# Patient Record
Sex: FEMALE | Race: WHITE | Employment: FULL TIME | URBAN - METROPOLITAN AREA
[De-identification: names, ages, dates, MRNs, and addresses within clinical notes are randomized per-mention and may not be internally consistent; named-entity substitution may affect disease eponyms.]

---

## 2022-03-19 PROBLEM — Z30.430 ENCOUNTER FOR IUD INSERTION: Status: ACTIVE | Noted: 2019-07-11

## 2022-03-19 PROBLEM — F33.41 RECURRENT MAJOR DEPRESSIVE DISORDER, IN PARTIAL REMISSION (HCC): Status: ACTIVE | Noted: 2017-10-13

## 2022-03-20 PROBLEM — N64.4 NIPPLE PAIN: Status: ACTIVE | Noted: 2019-07-11

## 2022-06-01 ENCOUNTER — OFFICE VISIT (OUTPATIENT)
Dept: OBGYN CLINIC | Age: 37
End: 2022-06-01
Payer: COMMERCIAL

## 2022-06-01 VITALS
BODY MASS INDEX: 23.14 KG/M2 | HEIGHT: 63 IN | DIASTOLIC BLOOD PRESSURE: 60 MMHG | WEIGHT: 130.6 LBS | SYSTOLIC BLOOD PRESSURE: 100 MMHG

## 2022-06-01 DIAGNOSIS — Z11.3 SCREENING EXAMINATION FOR STD (SEXUALLY TRANSMITTED DISEASE): Primary | ICD-10-CM

## 2022-06-01 PROCEDURE — 99213 OFFICE O/P EST LOW 20 MIN: CPT | Performed by: NURSE PRACTITIONER

## 2022-06-01 NOTE — PROGRESS NOTES
The patient is a 39 y.o. No obstetric history on file. who is seen for STD testing. Patient states she has a new partner. Denies symptoms. Partner recently test and normal .    Paragrard IUD with Dr. Mariam Flanagan on 7/11/2019  Last pap 11/14/2019 Negative, STD negative       HISTORY:    No obstetric history on file. Patient's last menstrual period was 05/07/2022. Sexual History:  has sex with males  Contraception:  IUD  Current Outpatient Medications on File Prior to Visit   Medication Sig Dispense Refill    GARLIC PO Take by mouth      ARIPiprazole (ABILIFY) 2 MG tablet Take 2 mg by mouth      buPROPion (WELLBUTRIN SR) 100 MG extended release tablet TK 1 T PO QAM FOR DEPRESSION/ ADD      Paragard Intrauterine Copper IUD by IntraUTERine route      LORazepam (ATIVAN) 0.5 MG tablet TK 1 TO 2 TS PO QHS PRF INSOMNIA       No current facility-administered medications on file prior to visit. ROS:  Feeling well. No dyspnea or chest pain on exertion. No abdominal pain, change in bowel habits, black or bloody stools. No urinary tract symptoms. GYN ROS: normal menses, no abnormal bleeding, pelvic pain or discharge, no breast pain or new or enlarging lumps on self exam.    PHYSICAL EXAM:  Blood pressure 100/60, height 5' 3\" (1.6 m), weight 130 lb 9.6 oz (59.2 kg), last menstrual period 05/07/2022. The patient appears well, alert, oriented x 3, in no distress. .  Pelvic: normal external genitalia, vulva, vagina, cervix, uterus and adnexa, VULVA: normal appearing vulva with no masses, tenderness or lesions, VAGINA: normal appearing vagina with normal color and discharge, no lesions. ASSESSMENT:  Encounter Diagnosis   Name Primary?     Screening examination for STD (sexually transmitted disease) Yes       PLAN:  All questions answered  Check gc/chl/trich  Check blood screening    Orders Placed This Encounter   Procedures    Chlamydia, Gonorrhea, Trichomoniasis Swab     Standing Status:   Future     Number of Occurrences:   1     Standing Expiration Date:   6/1/2023    RPR Reflex to Titer and TPPA     Standing Status:   Future     Number of Occurrences:   1     Standing Expiration Date:   6/1/2023    Hepatitis C Antibody     Standing Status:   Future     Number of Occurrences:   1     Standing Expiration Date:   6/1/2023    HIV 1/2 Ag/Ab, 4TH Generation,W Rflx Confirm     Standing Status:   Future     Number of Occurrences:   1     Standing Expiration Date:   6/1/2023    Hepatitis B Surface Antigen     Standing Status:   Future     Number of Occurrences:   1     Standing Expiration Date:   6/1/2023         Supervising physician is Dr. Christine Galo. Greater than 50% of the 20 minute visit were spent in counseling to the above topics.

## 2022-06-03 LAB
C TRACH RRNA SPEC QL NAA+PROBE: NEGATIVE
N GONORRHOEA RRNA SPEC QL NAA+PROBE: NEGATIVE
SPECIMEN SOURCE: NORMAL
T VAGINALIS RRNA SPEC QL NAA+PROBE: NEGATIVE

## 2022-06-04 LAB
HBV SURFACE AG SER QL: NONREACTIVE
HCV AB SER QL: NONREACTIVE
HIV 1+2 AB+HIV1 P24 AG SERPL QL IA: NONREACTIVE
HIV 1/2 RESULT COMMENT: ABNORMAL
RPR SER QL: NONREACTIVE